# Patient Record
Sex: MALE | Race: WHITE | NOT HISPANIC OR LATINO | Employment: UNEMPLOYED | URBAN - METROPOLITAN AREA
[De-identification: names, ages, dates, MRNs, and addresses within clinical notes are randomized per-mention and may not be internally consistent; named-entity substitution may affect disease eponyms.]

---

## 2017-04-05 ENCOUNTER — TRANSFERRED RECORDS (OUTPATIENT)
Dept: HEALTH INFORMATION MANAGEMENT | Facility: CLINIC | Age: 37
End: 2017-04-05

## 2017-05-03 ENCOUNTER — TRANSFERRED RECORDS (OUTPATIENT)
Dept: HEALTH INFORMATION MANAGEMENT | Facility: CLINIC | Age: 37
End: 2017-05-03

## 2017-06-29 ENCOUNTER — TRANSFERRED RECORDS (OUTPATIENT)
Dept: HEALTH INFORMATION MANAGEMENT | Facility: CLINIC | Age: 37
End: 2017-06-29

## 2017-08-11 ENCOUNTER — TELEPHONE (OUTPATIENT)
Dept: ORTHOPEDICS | Facility: CLINIC | Age: 37
End: 2017-08-11
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
## 2022-09-02 ENCOUNTER — APPOINTMENT (OUTPATIENT)
Dept: LAB | Facility: CLINIC | Age: 42
End: 2022-09-02
Payer: COMMERCIAL

## 2022-09-02 ENCOUNTER — OFFICE VISIT (OUTPATIENT)
Dept: BEHAVIORAL HEALTH | Facility: CLINIC | Age: 42
End: 2022-09-02
Payer: COMMERCIAL

## 2022-09-02 ENCOUNTER — LAB (OUTPATIENT)
Dept: LAB | Facility: CLINIC | Age: 42
End: 2022-09-02
Payer: COMMERCIAL

## 2022-09-02 VITALS
BODY MASS INDEX: 40.69 KG/M2 | HEART RATE: 89 BPM | WEIGHT: 307 LBS | HEIGHT: 73 IN | SYSTOLIC BLOOD PRESSURE: 125 MMHG | DIASTOLIC BLOOD PRESSURE: 83 MMHG

## 2022-09-02 DIAGNOSIS — F10.20 ALCOHOL USE DISORDER, SEVERE, DEPENDENCE (H): ICD-10-CM

## 2022-09-02 DIAGNOSIS — F29 PSYCHOSIS, UNSPECIFIED PSYCHOSIS TYPE (H): ICD-10-CM

## 2022-09-02 DIAGNOSIS — F15.20 METHAMPHETAMINE USE DISORDER, SEVERE (H): ICD-10-CM

## 2022-09-02 DIAGNOSIS — F11.20 OPIOID USE DISORDER, SEVERE, DEPENDENCE (H): Primary | ICD-10-CM

## 2022-09-02 DIAGNOSIS — F11.20 OPIOID USE DISORDER, SEVERE, DEPENDENCE (H): ICD-10-CM

## 2022-09-02 DIAGNOSIS — F17.200 TOBACCO USE DISORDER: ICD-10-CM

## 2022-09-02 DIAGNOSIS — F39 MOOD DISORDER (H): ICD-10-CM

## 2022-09-02 DIAGNOSIS — F43.10 PTSD (POST-TRAUMATIC STRESS DISORDER): ICD-10-CM

## 2022-09-02 DIAGNOSIS — G47.00 INSOMNIA, UNSPECIFIED TYPE: ICD-10-CM

## 2022-09-02 PROBLEM — E66.01 MORBID OBESITY (H): Status: ACTIVE | Noted: 2022-09-02

## 2022-09-02 LAB
ALBUMIN SERPL-MCNC: 3.9 G/DL (ref 3.4–5)
ALP SERPL-CCNC: 104 U/L (ref 40–150)
ALT SERPL W P-5'-P-CCNC: 46 U/L (ref 0–70)
ANION GAP SERPL CALCULATED.3IONS-SCNC: 2 MMOL/L (ref 3–14)
AST SERPL W P-5'-P-CCNC: 33 U/L (ref 0–45)
BASOPHILS # BLD AUTO: 0.1 10E3/UL (ref 0–0.2)
BASOPHILS NFR BLD AUTO: 1 %
BILIRUB SERPL-MCNC: 0.3 MG/DL (ref 0.2–1.3)
BUN SERPL-MCNC: 8 MG/DL (ref 7–30)
CALCIUM SERPL-MCNC: 9 MG/DL (ref 8.5–10.1)
CHLORIDE BLD-SCNC: 107 MMOL/L (ref 94–109)
CO2 SERPL-SCNC: 31 MMOL/L (ref 20–32)
CREAT SERPL-MCNC: 0.86 MG/DL (ref 0.66–1.25)
EOSINOPHIL # BLD AUTO: 0.1 10E3/UL (ref 0–0.7)
EOSINOPHIL NFR BLD AUTO: 1 %
ERYTHROCYTE [DISTWIDTH] IN BLOOD BY AUTOMATED COUNT: 14 % (ref 10–15)
FENTANYL UR QL: NORMAL
GFR SERPL CREATININE-BSD FRML MDRD: >90 ML/MIN/1.73M2
GLUCOSE BLD-MCNC: 84 MG/DL (ref 70–99)
HCT VFR BLD AUTO: 44.6 % (ref 40–53)
HGB BLD-MCNC: 15.6 G/DL (ref 13.3–17.7)
IMM GRANULOCYTES # BLD: 0.1 10E3/UL
IMM GRANULOCYTES NFR BLD: 1 %
LYMPHOCYTES # BLD AUTO: 2.8 10E3/UL (ref 0.8–5.3)
LYMPHOCYTES NFR BLD AUTO: 34 %
MCH RBC QN AUTO: 32.5 PG (ref 26.5–33)
MCHC RBC AUTO-ENTMCNC: 35 G/DL (ref 31.5–36.5)
MCV RBC AUTO: 93 FL (ref 78–100)
MONOCYTES # BLD AUTO: 0.5 10E3/UL (ref 0–1.3)
MONOCYTES NFR BLD AUTO: 6 %
NEUTROPHILS # BLD AUTO: 4.9 10E3/UL (ref 1.6–8.3)
NEUTROPHILS NFR BLD AUTO: 57 %
NRBC # BLD AUTO: 0 10E3/UL
NRBC BLD AUTO-RTO: 0 /100
PLATELET # BLD AUTO: 271 10E3/UL (ref 150–450)
POTASSIUM BLD-SCNC: 3.8 MMOL/L (ref 3.4–5.3)
PROT SERPL-MCNC: 7.3 G/DL (ref 6.8–8.8)
RBC # BLD AUTO: 4.8 10E6/UL (ref 4.4–5.9)
SODIUM SERPL-SCNC: 140 MMOL/L (ref 133–144)
WBC # BLD AUTO: 8.4 10E3/UL (ref 4–11)

## 2022-09-02 PROCEDURE — 36415 COLL VENOUS BLD VENIPUNCTURE: CPT

## 2022-09-02 PROCEDURE — 80307 DRUG TEST PRSMV CHEM ANLYZR: CPT | Performed by: NURSE PRACTITIONER

## 2022-09-02 PROCEDURE — 99205 OFFICE O/P NEW HI 60 MIN: CPT | Performed by: FAMILY MEDICINE

## 2022-09-02 PROCEDURE — 80053 COMPREHEN METABOLIC PANEL: CPT

## 2022-09-02 PROCEDURE — 85025 COMPLETE CBC W/AUTO DIFF WBC: CPT

## 2022-09-02 PROCEDURE — 86803 HEPATITIS C AB TEST: CPT

## 2022-09-02 PROCEDURE — 87389 HIV-1 AG W/HIV-1&-2 AB AG IA: CPT

## 2022-09-02 RX ORDER — BUPRENORPHINE AND NALOXONE 8; 2 MG/1; MG/1
FILM, SOLUBLE BUCCAL; SUBLINGUAL
Qty: 14 FILM | Refills: 0 | Status: SHIPPED | OUTPATIENT
Start: 2022-09-02 | End: 2022-09-13

## 2022-09-02 RX ORDER — QUETIAPINE FUMARATE 100 MG/1
100 TABLET, FILM COATED ORAL AT BEDTIME
Qty: 30 TABLET | Refills: 0 | Status: SHIPPED | OUTPATIENT
Start: 2022-09-02 | End: 2022-09-21

## 2022-09-02 ASSESSMENT — PATIENT HEALTH QUESTIONNAIRE - PHQ9: SUM OF ALL RESPONSES TO PHQ QUESTIONS 1-9: 12
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
## 2022-09-03 ENCOUNTER — HOSPITAL ENCOUNTER (EMERGENCY)
Facility: CLINIC | Age: 42
Discharge: HOME | End: 2022-09-03
Attending: EMERGENCY MEDICINE | Admitting: EMERGENCY MEDICINE
Payer: COMMERCIAL

## 2022-09-03 VITALS
OXYGEN SATURATION: 98 % | SYSTOLIC BLOOD PRESSURE: 148 MMHG | RESPIRATION RATE: 14 BRPM | TEMPERATURE: 97.2 F | HEART RATE: 77 BPM | DIASTOLIC BLOOD PRESSURE: 100 MMHG

## 2022-09-03 DIAGNOSIS — F19.10 POLYSUBSTANCE ABUSE (H): ICD-10-CM

## 2022-09-03 DIAGNOSIS — F43.10 PTSD (POST-TRAUMATIC STRESS DISORDER): ICD-10-CM

## 2022-09-03 PROCEDURE — 99285 EMERGENCY DEPT VISIT HI MDM: CPT | Mod: 25

## 2022-09-03 PROCEDURE — 90791 PSYCH DIAGNOSTIC EVALUATION: CPT

## 2022-09-03 ASSESSMENT — ENCOUNTER SYMPTOMS
FEVER: 0
VOMITING: 0

## 2022-09-03 ASSESSMENT — ACTIVITIES OF DAILY LIVING (ADL): ADLS_ACUITY_SCORE: 33

## 2022-09-03 NOTE — ED PROVIDER NOTES
"  History   Chief Complaint:  Mental Health Problem       HPI   Adriano Meraz is a 42 year old male with history of PTSD who presents with mental health problem. He explains he was in an inpatient mental health center yesterday but was \"kicked out\" today, now homeless. Notes he was \"sticking up for [himself] to a staff member, causing him to be removed from the program. He reports he wishes to be placed back into inpatient treatment. Notes he started suboxone and seroquel yesterday at the center, complaining of drowsiness since taking first dose. Denies self harm or suicidal ideation but reports concern that he will be hurt by other people. Denies vomiting, fever, or other physical symptoms. Notes last use of marijuana Wednesday, methamphetamine Wednesday, fentanyl a week ago, alcohol yesterday.  Denies history of alcohol withdrawal.     Review of Systems   Constitutional: Negative for fever.   Gastrointestinal: Negative for vomiting.   Psychiatric/Behavioral: Negative for self-injury and suicidal ideas.   All other systems reviewed and are negative.      Allergies:  Latex  Tramadol  Codeine    Medications:  Suboxone  Narcan  Seroquel    Past Medical History:     Morbid obesity     Social History:  The patient presents to the ED alone.   Homeless    Physical Exam     Patient Vitals for the past 24 hrs:   BP Temp Temp src Pulse Resp SpO2   09/03/22 1248 (!) 150/107 97.2  F (36.2  C) Temporal 70 18 99 %       Physical Exam  General: Adult male sitting upright  Eyes: PERRL, Conjunctive within normal limits  ENT: Moist mucous membranes, oropharynx clear.   CV: Normal S1S2, no murmur, rub or gallop. Regular rate and rhythm  Resp: Clear to auscultation bilaterally. Normal respiratory effort..  MSK: ambulatory  Skin: Warm and dry.   Neuro: Alert and oriented. Responds appropriately to all questions and commands.   Psych: Normal mood and affect. Pleasant.    Emergency Department Course     Emergency Department " Course:       Reviewed:  I reviewed nursing notes, vitals, past medical history and Care Everywhere    Assessments:  1300 I obtained history and examined the patient as noted above.    I rechecked the patient and explained findings.     Consults:  1328 I consulted with DEC  regarding the patient.   I discussed DEC assessment with the . Patient given shelter resources, has treatment resources in place.     Disposition:  The patient is discharged in stable condition.     Impression & Plan     Medical Decision Making:  Adriano Meraz is a 43 y/o male with a history of PTSD and polysubstance abuse who presents with concerns for needing ongoing chem dep tx. He is not actively suicidal or homicidal. He is not overtly psychotic. He has outpatient resources for shelter and chemical dependency treatment. He will continue with medication therapy and f/u with resources, feeling comfortable with this plan.       Diagnosis:    ICD-10-CM    1. Polysubstance abuse (H)  F19.10    2. PTSD (post-traumatic stress disorder)  F43.10        Scribe Disclosure:  I, Florence Rowland, am serving as a scribe at 12:53 PM on 9/3/2022 to document services personally performed by Mona Bourgeois MD based on my observations and the provider's statements to me.          Mona Bourgeois MD  09/05/22 0922

## 2022-09-03 NOTE — DISCHARGE INSTRUCTIONS
If I am feeling unsafe or I am in a crisis, I will:      Contact my established care providers      Call the National Suicide Prevention Lifeline: [phone]      Go to the nearest emergency room      Call 911?   ?   Warning signs that I or other people might notice when a crisis is developing for me:?Noticing my own negative thoughts and emotions, noticing increased anxiety; racing thoughts, negative thoughts about the future, excessive fear about situations or how others will feel about me. Becoming upset and not being able to identify why. Lack of motivation. Having thoughts of wanting to harm myself or suicide.      Things I am able to do on my own to cope or help me feel better: Use 5 senses to practice self-soothing, taking deep breathes, get self away from the situation causing reaction, practice challenging anxious thoughts.       Things that I am able to do with others to cope or help me better: Engage in distracting conversations, talk openly about emotions, engage in distracting activities.  ?     Changes I can make to support my mental health and wellness: Establish healthy sleep hygiene routine, follow up with current therapy and be honest about all aspects of mental health,?take medications as prescribed, maintain safety in personal space, follow up with therapy and medication management. ?     People in my life that I can ask for help: [employer], trusted counselor, [facility], [facility], safe family member    Your county has a mental health crisis team you can call 24/7:? [facility]: [phone]      Chemical Dependency Treatment Follow-Up:    [facility]: [phone]  [address] [address] [place] [id number]    [place]: [facility]: [phone] [address], [place] [address]      [facility] Information:    [facility] [place] Hotline: [phone]    Adult Shelter Connect Hotline: [phone]  Address: [address] [place] [address]  [facility] [address] [place] [address]  [phone]    [facility] (FOR MEN)  [address] [place] [address]  [phone]
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
## 2022-09-05 LAB
HCV AB SERPL QL IA: NONREACTIVE
HIV 1+2 AB+HIV1 P24 AG SERPL QL IA: NONREACTIVE

## 2022-09-06 ENCOUNTER — HOSPITAL ENCOUNTER (EMERGENCY)
Facility: CLINIC | Age: 42
Discharge: HOME | End: 2022-09-07
Attending: EMERGENCY MEDICINE | Admitting: EMERGENCY MEDICINE
Payer: COMMERCIAL

## 2022-09-06 VITALS
OXYGEN SATURATION: 99 % | DIASTOLIC BLOOD PRESSURE: 109 MMHG | RESPIRATION RATE: 18 BRPM | HEART RATE: 88 BPM | SYSTOLIC BLOOD PRESSURE: 153 MMHG | TEMPERATURE: 97.6 F

## 2022-09-06 DIAGNOSIS — F19.10 DRUG ABUSE (H): ICD-10-CM

## 2022-09-06 PROCEDURE — 99283 EMERGENCY DEPT VISIT LOW MDM: CPT

## 2022-09-06 ASSESSMENT — ACTIVITIES OF DAILY LIVING (ADL)
ADLS_ACUITY_SCORE: 35
ADLS_ACUITY_SCORE: 33
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
## 2022-09-07 ASSESSMENT — ACTIVITIES OF DAILY LIVING (ADL): ADLS_ACUITY_SCORE: 35

## 2022-09-13 ENCOUNTER — OFFICE VISIT (OUTPATIENT)
Dept: BEHAVIORAL HEALTH | Facility: CLINIC | Age: 42
End: 2022-09-13
Payer: COMMERCIAL

## 2022-09-13 ENCOUNTER — OFFICE VISIT (OUTPATIENT)
Dept: BEHAVIORAL HEALTH | Facility: CLINIC | Age: 42
End: 2022-09-13

## 2022-09-13 VITALS — HEART RATE: 95 BPM | DIASTOLIC BLOOD PRESSURE: 82 MMHG | SYSTOLIC BLOOD PRESSURE: 130 MMHG

## 2022-09-13 DIAGNOSIS — F43.10 PTSD (POST-TRAUMATIC STRESS DISORDER): ICD-10-CM

## 2022-09-13 DIAGNOSIS — F11.20 OPIOID USE DISORDER, SEVERE, DEPENDENCE (H): Primary | ICD-10-CM

## 2022-09-13 DIAGNOSIS — F10.20 ALCOHOL USE DISORDER, SEVERE, DEPENDENCE (H): ICD-10-CM

## 2022-09-13 DIAGNOSIS — F15.20 METHAMPHETAMINE USE DISORDER, SEVERE (H): ICD-10-CM

## 2022-09-13 DIAGNOSIS — F29 PSYCHOSIS, UNSPECIFIED PSYCHOSIS TYPE (H): ICD-10-CM

## 2022-09-13 DIAGNOSIS — F17.200 TOBACCO USE DISORDER: ICD-10-CM

## 2022-09-13 LAB — FENTANYL UR QL: NORMAL

## 2022-09-13 PROCEDURE — 99214 OFFICE O/P EST MOD 30 MIN: CPT | Performed by: FAMILY MEDICINE

## 2022-09-13 PROCEDURE — 80307 DRUG TEST PRSMV CHEM ANLYZR: CPT | Performed by: FAMILY MEDICINE

## 2022-09-13 PROCEDURE — H0038 SELF-HELP/PEER SVC PER 15MIN: HCPCS

## 2022-09-13 PROCEDURE — 80299 QUANTITATIVE ASSAY DRUG: CPT | Mod: 90 | Performed by: FAMILY MEDICINE

## 2022-09-13 PROCEDURE — 99000 SPECIMEN HANDLING OFFICE-LAB: CPT | Performed by: FAMILY MEDICINE

## 2022-09-13 RX ORDER — BUPRENORPHINE AND NALOXONE 8; 2 MG/1; MG/1
1 FILM, SOLUBLE BUCCAL; SUBLINGUAL 2 TIMES DAILY
Qty: 20 FILM | Refills: 0 | Status: SHIPPED | OUTPATIENT
Start: 2022-09-13 | End: 2022-09-21

## 2022-09-13 ASSESSMENT — PATIENT HEALTH QUESTIONNAIRE - PHQ9: SUM OF ALL RESPONSES TO PHQ QUESTIONS 1-9: 16
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
## 2022-09-13 NOTE — NURSING NOTE
[facility]      Rooming information:  Approximate last use of illicit opioids: last week of 8/2022  Taking buprenorphine? Yes:  As prescribed? Yes:   Number of buprenorphine films/tablets remaining currently: 0  Side effects related to buprenorphine (constipation, dry mouth, sedation?) No   Narcan currently available: Yes  Other recent substance use:    Cannabis  and Methamphetamine   NICOTINE-Yes:   If using nicotine, ready to quit? No    Point of care urine drug screen positive for: amphetamines, benzodiazepines, methamphetamines and THC  *POC urine drug screen does not screen for Fentanyl      PHQ Assesment Total Score(s) 9/13/2022   PHQ-9 Score 16   Some recent data might be hidden       If PHQ-9 score of 15 or higher, has [facility] therapist or provider been notified? Yes    Any current suicidal ideation? No  If yes, has [facility] therapist or provider been notified? N/A    Primary care provider: Physician No Ref-Primary     Mental health provider: has an assessment set up with [facility] on 9/14/22    Insurance needs: active    Housing needs: stable at [place]    Contact information up to date? yes    [employer] Involvement none today (please obtain ESTELLA if pt would like to include)    Becca Akbar CMA  September 13, 2022  10:22 AM

## 2022-09-15 ENCOUNTER — TELEPHONE (OUTPATIENT)
Dept: BEHAVIORAL HEALTH | Facility: CLINIC | Age: 42
End: 2022-09-15

## 2022-09-16 LAB
BUPRENORPHINE UR-MCNC: 109 NG/ML
BUPRENORPHINE UR-MCNC: <2 NG/ML
NALOXONE UR CFM-MCNC: <100 NG/ML
NORBUPRENORPHINE UR CFM-MCNC: 301 NG/ML
NORBUPRENORPHINE UR-MCNC: 38 NG/ML

## 2022-09-21 ENCOUNTER — VIRTUAL VISIT (OUTPATIENT)
Dept: BEHAVIORAL HEALTH | Facility: CLINIC | Age: 42
End: 2022-09-21
Payer: COMMERCIAL

## 2022-09-21 DIAGNOSIS — F10.20 ALCOHOL USE DISORDER, SEVERE, DEPENDENCE (H): ICD-10-CM

## 2022-09-21 DIAGNOSIS — F11.20 OPIOID USE DISORDER, SEVERE, DEPENDENCE (H): Primary | ICD-10-CM

## 2022-09-21 DIAGNOSIS — G47.00 INSOMNIA, UNSPECIFIED TYPE: ICD-10-CM

## 2022-09-21 DIAGNOSIS — F43.10 PTSD (POST-TRAUMATIC STRESS DISORDER): ICD-10-CM

## 2022-09-21 DIAGNOSIS — F17.200 TOBACCO USE DISORDER: ICD-10-CM

## 2022-09-21 DIAGNOSIS — F29 PSYCHOSIS, UNSPECIFIED PSYCHOSIS TYPE (H): ICD-10-CM

## 2022-09-21 DIAGNOSIS — F15.20 METHAMPHETAMINE USE DISORDER, SEVERE (H): ICD-10-CM

## 2022-09-21 PROCEDURE — 99214 OFFICE O/P EST MOD 30 MIN: CPT | Mod: 95 | Performed by: FAMILY MEDICINE

## 2022-09-21 RX ORDER — BUPRENORPHINE AND NALOXONE 8; 2 MG/1; MG/1
1 FILM, SOLUBLE BUCCAL; SUBLINGUAL 2 TIMES DAILY
Qty: 30 FILM | Refills: 0
Start: 2022-09-21 | End: 2022-10-10

## 2022-09-21 RX ORDER — PRAZOSIN HYDROCHLORIDE 1 MG/1
1-3 CAPSULE ORAL
Qty: 90 CAPSULE | Refills: 0 | Status: SHIPPED | OUTPATIENT
Start: 2022-09-21

## 2022-09-21 RX ORDER — QUETIAPINE FUMARATE 100 MG/1
200 TABLET, FILM COATED ORAL AT BEDTIME
Qty: 60 TABLET | Refills: 0 | Status: SHIPPED | OUTPATIENT
Start: 2022-09-21 | End: 2022-10-10

## 2022-09-21 ASSESSMENT — PATIENT HEALTH QUESTIONNAIRE - PHQ9: SUM OF ALL RESPONSES TO PHQ QUESTIONS 1-9: 14

## 2022-09-21 NOTE — PROGRESS NOTES
[facility]      Rooming information:  Approximate last use of illicit opioids: last week of 8/2022  Taking buprenorphine? Yes:  As prescribed? Yes:   Number of buprenorphine films/tablets remaining currently: few  Side effects related to buprenorphine (constipation, dry mouth, sedation?) Yes: dry mouth and very poor sleep   Narcan currently available: Yes  Other recent substance use:    Denies  NICOTINE-Yes:   If using nicotine, ready to quit? No    Point of care urine drug screen positive for: buprenorphine  *POC urine drug screen does not screen for Fentanyl      PHQ Assesment Total Score(s) 9/21/2022   PHQ-9 Score 14   Some recent data might be hidden       If PHQ-9 score of 15 or higher, has [facility] therapist or provider been notified? N/A    Any current suicidal ideation? No  If yes, has [facility] therapist or provider been notified? N/A    Primary care provider: Physician No Ref-Primary     Mental health provider: going to [facility] on Monday (follow up on MH referral if needed)    Insurance needs: active    Housing needs: stable at [place]    Contact information up to date? yes    3rd Party Involvement none today (please obtain ESTELLA if pt would like to include)    Becca Akbar CMA  September 21, 2022  12:37 PM
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
## 2022-10-05 ENCOUNTER — TELEPHONE (OUTPATIENT)
Dept: BEHAVIORAL HEALTH | Facility: CLINIC | Age: 42
End: 2022-10-05

## 2022-10-05 NOTE — TELEPHONE ENCOUNTER
Hi, left voicemail on pt. Phone to call  to get rescheduled. Pt can also come in during walk in hours

## 2022-10-05 NOTE — TELEPHONE ENCOUNTER
This writer called the patient about his missed appointment and he was not aware that he had an appointment today and he stated that he will do a walk in this week in the clinic.

## 2022-10-10 ENCOUNTER — OFFICE VISIT (OUTPATIENT)
Dept: BEHAVIORAL HEALTH | Facility: CLINIC | Age: 42
End: 2022-10-10
Payer: COMMERCIAL

## 2022-10-10 VITALS — DIASTOLIC BLOOD PRESSURE: 109 MMHG | SYSTOLIC BLOOD PRESSURE: 157 MMHG | HEART RATE: 103 BPM

## 2022-10-10 DIAGNOSIS — F29 PSYCHOSIS, UNSPECIFIED PSYCHOSIS TYPE (H): ICD-10-CM

## 2022-10-10 DIAGNOSIS — F11.20 OPIOID USE DISORDER, SEVERE, DEPENDENCE (H): Primary | ICD-10-CM

## 2022-10-10 DIAGNOSIS — F17.200 TOBACCO USE DISORDER: ICD-10-CM

## 2022-10-10 DIAGNOSIS — F15.20 METHAMPHETAMINE USE DISORDER, SEVERE (H): ICD-10-CM

## 2022-10-10 DIAGNOSIS — G47.00 INSOMNIA, UNSPECIFIED TYPE: ICD-10-CM

## 2022-10-10 DIAGNOSIS — F43.10 PTSD (POST-TRAUMATIC STRESS DISORDER): ICD-10-CM

## 2022-10-10 DIAGNOSIS — F10.20 ALCOHOL USE DISORDER, SEVERE, DEPENDENCE (H): ICD-10-CM

## 2022-10-10 PROCEDURE — 99214 OFFICE O/P EST MOD 30 MIN: CPT | Performed by: FAMILY MEDICINE

## 2022-10-10 RX ORDER — BUPRENORPHINE AND NALOXONE 8; 2 MG/1; MG/1
1 FILM, SOLUBLE BUCCAL; SUBLINGUAL 2 TIMES DAILY
Qty: 20 FILM | Refills: 0 | Status: SHIPPED | OUTPATIENT
Start: 2022-10-10 | End: 2022-12-19

## 2022-10-10 RX ORDER — QUETIAPINE FUMARATE 200 MG/1
200 TABLET, FILM COATED ORAL AT BEDTIME
Qty: 30 TABLET | Refills: 0 | Status: SHIPPED | OUTPATIENT
Start: 2022-10-10 | End: 2022-12-19

## 2022-10-10 ASSESSMENT — PATIENT HEALTH QUESTIONNAIRE - PHQ9: SUM OF ALL RESPONSES TO PHQ QUESTIONS 1-9: 18
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
## 2022-10-26 ENCOUNTER — TELEPHONE (OUTPATIENT)
Dept: BEHAVIORAL HEALTH | Facility: CLINIC | Age: 42
End: 2022-10-26
Payer: COMMERCIAL

## 2022-10-26 DIAGNOSIS — F11.20 OPIOID USE DISORDER, SEVERE, DEPENDENCE (H): Primary | ICD-10-CM

## 2022-10-26 NOTE — TELEPHONE ENCOUNTER
MIGDALIA placed a telephone recovery support call to pt given a recent no show for scheduled appointment with provider in [facility].    [facility] left a voicemail message providing [facility] number [phone] to schedule a new appointment, as well as Recovery Clinic Walk-In hours: Monday - Friday from 9 am to 3 pm.     [place]  [facility]   [facility]   Direct Dial: [phone]    9:45 am

## 2022-12-19 ENCOUNTER — LAB (OUTPATIENT)
Dept: LAB | Facility: CLINIC | Age: 42
End: 2022-12-19
Payer: COMMERCIAL

## 2022-12-19 ENCOUNTER — OFFICE VISIT (OUTPATIENT)
Dept: BEHAVIORAL HEALTH | Facility: CLINIC | Age: 42
End: 2022-12-19
Payer: COMMERCIAL

## 2022-12-19 VITALS — HEART RATE: 83 BPM | DIASTOLIC BLOOD PRESSURE: 97 MMHG | SYSTOLIC BLOOD PRESSURE: 141 MMHG

## 2022-12-19 DIAGNOSIS — F39 MOOD DISORDER (H): ICD-10-CM

## 2022-12-19 DIAGNOSIS — F43.10 PTSD (POST-TRAUMATIC STRESS DISORDER): ICD-10-CM

## 2022-12-19 DIAGNOSIS — G47.00 INSOMNIA, UNSPECIFIED TYPE: ICD-10-CM

## 2022-12-19 DIAGNOSIS — F15.20 METHAMPHETAMINE USE DISORDER, SEVERE (H): ICD-10-CM

## 2022-12-19 DIAGNOSIS — F11.20 OPIOID USE DISORDER, SEVERE, DEPENDENCE (H): ICD-10-CM

## 2022-12-19 DIAGNOSIS — F11.20 OPIOID USE DISORDER, SEVERE, DEPENDENCE (H): Primary | ICD-10-CM

## 2022-12-19 LAB
ALBUMIN SERPL-MCNC: 3.8 G/DL (ref 3.4–5)
ALP SERPL-CCNC: 124 U/L (ref 40–150)
ALT SERPL W P-5'-P-CCNC: 37 U/L (ref 0–70)
ANION GAP SERPL CALCULATED.3IONS-SCNC: 6 MMOL/L (ref 3–14)
AST SERPL W P-5'-P-CCNC: 25 U/L (ref 0–45)
BASOPHILS # BLD AUTO: 0.1 10E3/UL (ref 0–0.2)
BASOPHILS NFR BLD AUTO: 1 %
BILIRUB SERPL-MCNC: 0.2 MG/DL (ref 0.2–1.3)
BUN SERPL-MCNC: 9 MG/DL (ref 7–30)
CALCIUM SERPL-MCNC: 9 MG/DL (ref 8.5–10.1)
CHLORIDE BLD-SCNC: 104 MMOL/L (ref 94–109)
CO2 SERPL-SCNC: 28 MMOL/L (ref 20–32)
CREAT SERPL-MCNC: 0.69 MG/DL (ref 0.66–1.25)
EOSINOPHIL # BLD AUTO: 0.1 10E3/UL (ref 0–0.7)
EOSINOPHIL NFR BLD AUTO: 1 %
ERYTHROCYTE [DISTWIDTH] IN BLOOD BY AUTOMATED COUNT: 13.1 % (ref 10–15)
FENTANYL UR QL: NORMAL
GFR SERPL CREATININE-BSD FRML MDRD: >90 ML/MIN/1.73M2
GLUCOSE BLD-MCNC: 88 MG/DL (ref 70–99)
HCT VFR BLD AUTO: 46.3 % (ref 40–53)
HGB BLD-MCNC: 16.3 G/DL (ref 13.3–17.7)
IMM GRANULOCYTES # BLD: 0.2 10E3/UL
IMM GRANULOCYTES NFR BLD: 2 %
LYMPHOCYTES # BLD AUTO: 3.1 10E3/UL (ref 0.8–5.3)
LYMPHOCYTES NFR BLD AUTO: 31 %
MCH RBC QN AUTO: 31.2 PG (ref 26.5–33)
MCHC RBC AUTO-ENTMCNC: 35.2 G/DL (ref 31.5–36.5)
MCV RBC AUTO: 89 FL (ref 78–100)
MONOCYTES # BLD AUTO: 0.6 10E3/UL (ref 0–1.3)
MONOCYTES NFR BLD AUTO: 6 %
NEUTROPHILS # BLD AUTO: 5.9 10E3/UL (ref 1.6–8.3)
NEUTROPHILS NFR BLD AUTO: 59 %
NRBC # BLD AUTO: 0 10E3/UL
NRBC BLD AUTO-RTO: 0 /100
PLATELET # BLD AUTO: 267 10E3/UL (ref 150–450)
POTASSIUM BLD-SCNC: 3.6 MMOL/L (ref 3.4–5.3)
PROT SERPL-MCNC: 7.5 G/DL (ref 6.8–8.8)
RBC # BLD AUTO: 5.22 10E6/UL (ref 4.4–5.9)
SODIUM SERPL-SCNC: 138 MMOL/L (ref 133–144)
WBC # BLD AUTO: 10 10E3/UL (ref 4–11)

## 2022-12-19 PROCEDURE — 87389 HIV-1 AG W/HIV-1&-2 AB AG IA: CPT

## 2022-12-19 PROCEDURE — 85025 COMPLETE CBC W/AUTO DIFF WBC: CPT

## 2022-12-19 PROCEDURE — 99214 OFFICE O/P EST MOD 30 MIN: CPT | Performed by: FAMILY MEDICINE

## 2022-12-19 PROCEDURE — 80325 AMPHETAMINES 3OR 4: CPT

## 2022-12-19 PROCEDURE — 86803 HEPATITIS C AB TEST: CPT

## 2022-12-19 PROCEDURE — 80307 DRUG TEST PRSMV CHEM ANLYZR: CPT

## 2022-12-19 PROCEDURE — 36415 COLL VENOUS BLD VENIPUNCTURE: CPT

## 2022-12-19 PROCEDURE — 80053 COMPREHEN METABOLIC PANEL: CPT

## 2022-12-19 PROCEDURE — 82040 ASSAY OF SERUM ALBUMIN: CPT

## 2022-12-19 RX ORDER — BUPRENORPHINE AND NALOXONE 8; 2 MG/1; MG/1
1 FILM, SOLUBLE BUCCAL; SUBLINGUAL 2 TIMES DAILY
Qty: 30 FILM | Refills: 0 | Status: SHIPPED | OUTPATIENT
Start: 2022-12-19

## 2022-12-19 RX ORDER — QUETIAPINE FUMARATE 200 MG/1
200 TABLET, FILM COATED ORAL AT BEDTIME
Qty: 30 TABLET | Refills: 0 | Status: SHIPPED | OUTPATIENT
Start: 2022-12-19

## 2022-12-19 ASSESSMENT — PATIENT HEALTH QUESTIONNAIRE - PHQ9: SUM OF ALL RESPONSES TO PHQ QUESTIONS 1-9: 15

## 2022-12-19 NOTE — PROGRESS NOTES
[facility] Follow Up    ASSESSMENT/PLAN                                                      1. Opioid use disorder, severe, dependence (H)  Overall reporting control of symptoms.  Discussed importance of taking Suboxone as prescribed.  Continue Suboxone 8-2mg BID.  Has Narcan.  - buprenorphine HCl-naloxone HCl (SUBOXONE) 8-2 MG per film; Place 1 Film under the tongue 2 times daily  Dispense: 30 Film; Refill: 0  - HIV Antigen Antibody Combo; Future  - Hepatitis C Screen Reflex to HCV RNA Quant and Genotype; Future  - CBC with platelets and differential; Future  - Comprehensive metabolic panel (BMP + Alb, Alk Phos, ALT, AST, Total. Bili, TP); Future    2. Methamphetamine use disorder, severe (H)  Denies use.  Will send confirmatory testing.  He is requesting repeat labs today.  - Amphetamine Quantitative Urine; Future  - HIV Antigen Antibody Combo; Future  - Hepatitis C Screen Reflex to HCV RNA Quant and Genotype; Future  - CBC with platelets and differential; Future  - Comprehensive metabolic panel (BMP + Alb, Alk Phos, ALT, AST, Total. Bili, TP); Future    3. PTSD (post-traumatic stress disorder)  4. Mood disorder (H)  Discussed benefit of working with psychiatry.  He will call to schedule appointment.  Encouraged him to schedule with [facility] as well for additional support.  He has some paranoia and delusional thinking but is not at imminent risk of harming self or others (no SI/HI).  Encouraged continued work with [facility] worker.    - Adult Mental Health  Referral; Future    5. Insomnia, unspecified type  OK to continue Seroquel.  Discussed benefit of ensuring adequate sleep.  - QUEtiapine (SEROQUEL) 200 MG tablet; Take 1 tablet (200 mg) by mouth At Bedtime  Dispense: 30 tablet; Refill: 0         Return in about 2 weeks (around 1/2/2023) for Follow up, in person.    SUBJECTIVE                                                          CC/HPI:  Adriano Meraz is a 42  year old male with PMH dyslipidemia, psychosis, mood disorder, ADHD per pt, PTSD,  alcohol use disorder, tobacco use disorder, methamphetamine use disorder, and opioid use disorder on buprenorphine who presents to the [facility] for return visit.          Brief History:   Adriano was first seen in Recovery Clinic on 9/2/22.  He was referred by staff at [facility] where he recently re-entered residential treatment.   Pt decided to seek treatment in Recovery Clinic to start buprenorphine.  He was prescribed buprenorphine through  after first visit.  He was able to start buprenorphine without difficulty.  Pt was asked to leave [facility] on 9/3/22.  During transition to new living arrangement, did have return to use of methamphetamine, was able to continue buprenorphine at 8mg/day.  Rx increased to 16mg/day 9/13/22.      Substance Use History :  Opioids:   Age at first use: around 19  Current use: timing of last use: end of 8/2022; substance: fentanyl - pressed pills; quantity couple pills; route: oral/smoke at time.  Reports he will use fentanyl for a week at a time, then not use for a week or more.  Uses opioids when he cannot obtain methamphetamine or alcohol                IV drug use: No   History of overdose: No  Previous treatments : Yes: [facility] ; h/o buprenorphine 5/2022, methadone 12/2021-2/2022  Longest period of sobriety: 10 years  Medical complications related to substance use: denies  Hepatitis C: 9/2/22 HCV ab nonreactive  HIV: 9/2/22 HIV ag/ab nonreactive     Other Addiction History:  Stimulants:   Past use: Meth- last 20 years daily; Cocaine- rare  Use in last 6 months: Meth-daily, orally, last use was 9/7/22  Sedatives/hypnotics/anxiolytics:   Past use: denies  Use in last 6 months: denies  Alcohol:   Past use: started drinking age 14, heavy use about liter daily  Use in last 6 months: liter daily; most recently since 7/2022; last drank 9/1/22, had not drank for 1 1/2 weeks prior to 9/1  Nicotine:    Cigarettes: currently  Vaping: currently  Chewing tobacco: currently   Cannabis:   Past use: denies  Use in last 6 months: rare Delta 8  Hallucinogens:   Past use: occasional use  Use in last 6 months: denies  Behavioral addictions:   denies      Most recent [facility] visit: 10/10/22    A/P last visit:  1. Opioid use disorder, severe, dependence (H)  Reporting control of symptoms  Continue buprenorphine 16mg/day  - buprenorphine HCl-naloxone HCl (SUBOXONE) 8-2 MG per film; Place 1 Film under the tongue 2 times daily  Dispense: 20 Film; Refill: 0     2. Methamphetamine use disorder, severe (H)  Reports use ~10/6-10/8/22.  Not interested in psychosocial treatment.  Reviewed negative effect of methamphetamine use on mental health, encouraged abstinence.      3. Alcohol use disorder, severe, dependence (H)  Denies cravings or use     4. Tobacco use disorder  Not ready to quit at this time     5. Insomnia, unspecified type  Refilled Seroquel unchanged  - QUEtiapine (SEROQUEL) 200 MG tablet; Take 1 tablet (200 mg) by mouth At Bedtime  Dispense: 30 tablet; Refill: 0     6. PTSD  Recommend he increase prazosin to 3mg/night, no rx required     7. Psychosis  S/p mental health evaluation with [email or web address].  Pt declined to sign ESTELLA today for [employer]    Pt states he meets with [employer] worker 10/12/22.    Pt is not an imminent threat to himself or others.    12/19/22:  Living in sober house for past 4 months  Tested positive for methamphetamine today but reports he has not used for over 1 month  No alcohol cravings, did drink 4 drinks during football game a few weeks ago  Does get methamphetamine cravings  Last use of opioid was in August 2022  Has been stretching doses of buprenorphine since last visit, last dose was last night (took 8-2mg)  Taking Seroquel intermittently - only takes if anxious or at bedtime when needed  Focusing on himself more, creating boundaries  Giving himself some space from others is helpful  "when anxious - grounding himself  Not sleeping well - he snores and housemates wake him  Not taking prazosin, not having dreams because of waking frequently  \"I'm always glass half full\"  Some hopelessness feeling others are out for him  Was on stay of commitment, ended in July 2022  Has [facility] worker, last visit was about 1 month due to conflicts, scheduled to connect today at 2pm    [place] Prescription Drug Monitoring Program Reviewed:  Yes: as expected - last filled Suboxone 8-2mg film on 10/10/22, #20 (10 days).    Medications:  naloxone (NARCAN) 4 MG/0.1ML nasal spray, Spray 1 spray (4 mg) into one nostril alternating nostrils as needed for opioid reversal every 2-3 minutes until assistance arrives (Patient not taking: Reported on 9/13/2022)  prazosin (MINIPRESS) 1 MG capsule, Take 1-3 capsules (1-3 mg) by mouth nightly as needed (nightmares) (Patient not taking: Reported on 12/19/2022)    No current facility-administered medications on file prior to visit.      Allergies   Allergen Reactions     Latex Other (See Comments)     Tramadol Dizziness and Nausea and Vomiting     PN: vomitting       Codeine Rash     PN: LW Reaction: Rash, Generalized         PMH, PSH, FamHx reviewed    Diagnoses- PTSD, anxiety, depression, psychosis   Suicide Attempts: No   Hospitalizations: Yes 11/1/21   Has application for [facility] worker; has advocates through [employer]    Social History  Housing status: in a sober house since 9/7/22  Employment status: Unemployed, not seeking work  Relationship status: Single  Children: 3 children, 10 YO twins, 15 YO  Legal: pt denies current; EMR shows h/o 5th degree felony possession and domestic assault charges prior to entering civil commitment 11/2021       OBJECTIVE                                                      BP (!) 141/97   Pulse 83     Physical Exam  Vitals and nursing note reviewed.   Constitutional:       General: He is not in acute distress.     " Appearance: Normal appearance. He is not ill-appearing or diaphoretic.   HENT:      Head: Normocephalic and atraumatic.      Nose: No congestion or rhinorrhea.   Cardiovascular:      Rate and Rhythm: Normal rate.   Pulmonary:      Effort: Pulmonary effort is normal. No respiratory distress.   Skin:     General: Skin is warm and dry.      Coloration: Skin is not jaundiced or pale.   Neurological:      General: No focal deficit present.      Mental Status: He is alert and oriented to person, place, and time.      Gait: Gait normal.   Psychiatric:         Attention and Perception: Attention normal.         Mood and Affect: Mood is anxious and depressed. Affect is labile.         Speech: Speech is rapid and pressured.         Behavior: Behavior is cooperative.         Thought Content: Thought content is paranoid and delusional. Thought content does not include homicidal or suicidal ideation.      Comments: Judgment/insight poor          Labs:    UDS:   Point of care urine drug screen positive for:  Urine Drug Screen Results  AMP: Negative  BAR: Negative  BUP: Positive  BZO: Negative  MAGGIE: Negative  mAMP: Positive  MDMA : Negative  MTD: Negative  [id number]: Negative  OXY: Negative  PCP : Negative  THC : Negative  *POC urine drug screen does not screen for Fentanyl      No results found for this or any previous visit (from the past 240 hour(s)).      Patient counseling completed today:  Discussed mechanism of action, potential risks/benefits/side effects of medications and other recommendations above.  Recommend pt keep naloxone in their possession and reviewed other aspects of harm reduction to reduce risk of overdose with return to use.   Recommended avoiding concurrent use of alcohol, benzodiazepines or other sedatives with buprenorphine or other opioids.  Discussed importance of avoiding isolation, building a network of supportive relationships, avoiding people/places/things associated with past use to reduce risk of  relapse; including motivational interviewing regarding psychosocial treatment for addiction.       Noemi Donnelly DO  [facility]  [address] [place] [id number] [phone]

## 2022-12-19 NOTE — NURSING NOTE
"[facility]      Rooming information:  Approximate last use of full opioid agonist: 8/2022  Taking buprenorphine? Yes:  As prescribed? No  Number of buprenorphine films/tablets remaining currently: 0  Side effects related to buprenorphine (constipation, dry mouth, sedation?) No   Narcan currently available: Yes  Other recent substance use:    Methamphetamine- \"over 30 days ago\"   NICOTINE-Yes:   If using nicotine, ready to quit? No    Point of care urine drug screen positive for:  Urine Drug Screen Results  AMP: Negative  BAR: Negative  BUP: Positive  BZO: Negative  MAGGIE: Negative  mAMP: Positive  MDMA : Negative  MTD: Negative  [id number]: Negative  OXY: Negative  PCP : Negative  THC : Negative  *POC urine drug screen does not screen for Fentanyl      PHQ Assesment Total Score(s) 12/19/2022   PHQ-9 Score 15   Some recent data might be hidden       If PHQ-9 score of 15 or higher, has [facility] therapist or provider been notified? Yes    Any current suicidal ideation? No  If yes, has [facility] therapist or provider been notified? N/A    Primary care provider: Physician No Ref-Primary     Mental health provider: [employer]- pt did stop seeing them    Insurance needs: active    Housing needs: [facility]    Contact information up to date? yes    [employer] none today (please obtain ESTELLA if pt would like to include)    Becca Akbar CMA  December 19, 2022  12:38 PM    "

## 2022-12-19 NOTE — PATIENT INSTRUCTIONS
Resume Suboxone 8-2mg twice daily.  Make sure you get refill of Narcan.    Continue Seroquel.    Schedule appointment with psychiatry: [phone]    Don can be a great support for mental health.     [place] [id number], [address]   [place], [place], [address]  Phone: [phone]  Fax: [phone]    Open Monday-Friday  9:00am-4:00pm  Closed over lunch hour  Walk in hours: 9am-11:30am and 12:30-3pm

## 2022-12-20 LAB
HCV AB SERPL QL IA: NONREACTIVE
HIV 1+2 AB+HIV1 P24 AG SERPL QL IA: NONREACTIVE

## 2022-12-22 ENCOUNTER — TELEPHONE (OUTPATIENT)
Dept: BEHAVIORAL HEALTH | Facility: CLINIC | Age: 42
End: 2022-12-22

## 2022-12-22 NOTE — TELEPHONE ENCOUNTER
----- Message from Noemi Donnelly DO sent at 12/22/2022  2:33 PM CST -----  Please let Adriano know that his HIV and hep C screening tests were negative.  His blood count, electrolytes, kidney, and liver tests were all normal.    Noemi Donnelly DO on 12/22/2022 at 2:33 PM

## 2022-12-23 LAB
AMPHET UR-MCNC: 90 NG/ML
MDA UR-MCNC: <200 NG/ML
MDEA UR-MCNC: <200 NG/ML
MDMA UR-MCNC: <200 NG/ML
METHAMPHET UR-MCNC: 210 NG/ML
PHENTERMINE UR CFM-MCNC: <200 NG/ML

## 2023-01-03 ENCOUNTER — TELEPHONE (OUTPATIENT)
Dept: BEHAVIORAL HEALTH | Facility: CLINIC | Age: 43
End: 2023-01-03

## 2023-01-03 DIAGNOSIS — F11.20 OPIOID USE DISORDER, SEVERE, DEPENDENCE (H): Primary | ICD-10-CM

## 2023-01-03 NOTE — TELEPHONE ENCOUNTER
MIGDALIA placed two telephone recovery support call attempts to pt given a recent no show for scheduled appointment with provider in [facility].    MIGDALIA unsuccessful in reaching pt as calls initially had two ring tones followed by a constant busy signal.     MIGDALIA unable to leave a voicemail message providing [facility] number [phone] to schedule a new appointment, as well as [facility] Walk-In hours: Monday - Friday from 9 am to 3 pm.     [facility] [place]  [facility]   Direct Dial: [phone]    3:20 pm